# Patient Record
Sex: FEMALE | Race: WHITE | URBAN - METROPOLITAN AREA
[De-identification: names, ages, dates, MRNs, and addresses within clinical notes are randomized per-mention and may not be internally consistent; named-entity substitution may affect disease eponyms.]

---

## 2021-11-08 ENCOUNTER — APPOINTMENT (RX ONLY)
Dept: URBAN - METROPOLITAN AREA CLINIC 12 | Facility: CLINIC | Age: 61
Setting detail: DERMATOLOGY
End: 2021-11-08

## 2021-11-08 DIAGNOSIS — L259 CONTACT DERMATITIS AND OTHER ECZEMA, UNSPECIFIED CAUSE: ICD-10-CM | Status: WORSENING

## 2021-11-08 PROBLEM — L30.8 OTHER SPECIFIED DERMATITIS: Status: ACTIVE | Noted: 2021-11-08

## 2021-11-08 PROCEDURE — 99203 OFFICE O/P NEW LOW 30 MIN: CPT

## 2021-11-08 PROCEDURE — ? COUNSELING

## 2021-11-08 PROCEDURE — ? TREATMENT REGIMEN

## 2021-11-08 PROCEDURE — ? OTHER

## 2021-11-08 PROCEDURE — ? PRESCRIPTION

## 2021-11-08 RX ORDER — TRIAMCINOLONE ACETONIDE 1 MG/G
OINTMENT TOPICAL
Qty: 453.6 | Refills: 1 | Status: ERX | COMMUNITY
Start: 2021-11-08

## 2021-11-08 RX ORDER — CEPHALEXIN 500 MG/1
CAPSULE ORAL
Qty: 20 | Refills: 0 | Status: ERX | COMMUNITY
Start: 2021-11-08

## 2021-11-08 RX ADMIN — CEPHALEXIN: 500 CAPSULE ORAL at 00:00

## 2021-11-08 RX ADMIN — TRIAMCINOLONE ACETONIDE: 1 OINTMENT TOPICAL at 00:00

## 2021-11-08 NOTE — PROCEDURE: COUNSELING
Detail Level: Detailed
Cleanser Recommendations: Aveeno Body Wash, Fragrance-Free
Antihistamine Recommendations: Zyrtec 10 mg once or twice daily (drowsiness warned)\\nBenadryl 25-50 before bed PRN
Moisturizer Recommendations: CeraVe Cream or Aveeno cream

## 2021-11-08 NOTE — PROCEDURE: OTHER
Detail Level: Zone
Render Risk Assessment In Note?: no
Note Text (......Xxx Chief Complaint.): This diagnosis correlates with the
Other (Free Text): present x 1.5 weeks\\nvery itchy arms, chest and upper gluteal fold.\\npt has changed to laundry detergent pods in the past 1+ month.\\nSoaps: deodorant soap for skin folds, switches body wash with fragrances\\nMoisturizer: none\\nquick hot showers.  \\nPmhx of asthma and allergies (mold)\\ndenies hx of eczema.\\nRubbing EToH\\n\\nworks with pt’s that cannot care for themselves.  \\ndenies hx of neck and shoulder problems

## 2021-11-08 NOTE — HPI: RASH
What Type Of Note Output Would You Prefer (Optional)?: Bullet Format
How Severe Is Your Rash?: moderate
Is This A New Presentation, Or A Follow-Up?: Rash
Additional History: Declines fbe, here today for rash evaluation on arms, chest and back

## 2021-11-08 NOTE — PROCEDURE: TREATMENT REGIMEN
Modify Regimen: Fragrance and Dye Free body wash, quick luke warm showers.
Initiate Treatment: TAC oinment bid x 3 weeks then m-f, skip weekends. repeat as needed. NOT FOR FACE\\ncephalexin 500 mg one tablet twice daily for 10 days.
Detail Level: Zone
Otc Regimen: Allegra 180 mg: take 1 tablet twice daily\\nBenadryl 25-50 mg before bed for itch\\nCeraVe Itch Relief Lotion as often as needed for itch.

## 2021-11-17 ENCOUNTER — APPOINTMENT (RX ONLY)
Dept: URBAN - METROPOLITAN AREA CLINIC 12 | Facility: CLINIC | Age: 61
Setting detail: DERMATOLOGY
End: 2021-11-17

## 2021-11-17 DIAGNOSIS — L259 CONTACT DERMATITIS AND OTHER ECZEMA, UNSPECIFIED CAUSE: ICD-10-CM | Status: WORSENING

## 2021-11-17 PROBLEM — L30.9 DERMATITIS, UNSPECIFIED: Status: ACTIVE | Noted: 2021-11-17

## 2021-11-17 PROCEDURE — ? PRESCRIPTION

## 2021-11-17 PROCEDURE — ? OTHER

## 2021-11-17 PROCEDURE — ? TREATMENT REGIMEN

## 2021-11-17 PROCEDURE — ? COUNSELING

## 2021-11-17 PROCEDURE — 11105 PUNCH BX SKIN EA SEP/ADDL: CPT

## 2021-11-17 PROCEDURE — ? BIOPSY BY PUNCH METHOD

## 2021-11-17 PROCEDURE — 11104 PUNCH BX SKIN SINGLE LESION: CPT

## 2021-11-17 RX ORDER — DOXEPIN HYDROCHLORIDE 10 MG/1
CAPSULE ORAL
Qty: 60 | Refills: 1 | Status: ERX | COMMUNITY
Start: 2021-11-17

## 2021-11-17 RX ORDER — CEPHALEXIN 500 MG/1
CAPSULE ORAL
Qty: 20 | Refills: 0 | Status: ERX

## 2021-11-17 RX ORDER — TRIAMCINOLONE ACETONIDE 1 MG/G
OINTMENT TOPICAL
Qty: 453.6 | Refills: 1 | Status: ACTIVE

## 2021-11-17 RX ADMIN — DOXEPIN HYDROCHLORIDE: 10 CAPSULE ORAL at 00:00

## 2021-11-17 ASSESSMENT — LOCATION SIMPLE DESCRIPTION DERM: LOCATION SIMPLE: LEFT FOREARM

## 2021-11-17 ASSESSMENT — LOCATION DETAILED DESCRIPTION DERM: LOCATION DETAILED: LEFT PROXIMAL DORSAL FOREARM

## 2021-11-17 ASSESSMENT — LOCATION ZONE DERM: LOCATION ZONE: ARM

## 2021-11-17 NOTE — PROCEDURE: BIOPSY BY PUNCH METHOD
Body Location Override (Optional - Billing Will Still Be Based On Selected Body Map Location If Applicable): left forearm
Detail Level: Detailed
Was A Bandage Applied: Yes
Punch Size In Mm: 4
Biopsy Type: H and E
Anesthesia Type: 1% lidocaine with epinephrine
Anesthesia Volume In Cc (Will Not Render If 0): 0.5
Additional Anesthesia Volume In Cc (Will Not Render If 0): 0
Hemostasis: None
Epidermal Sutures: 4-0 Nylon
Wound Care: Petrolatum
Dressing: bandage
Patient Will Remove Sutures At Home?: No
Lab: -101
Consent: Verbal consent was obtained and risks were reviewed including but not limited to scarring, infection, bleeding, scabbing, incomplete removal, nerve damage and allergy to anesthesia.
Post-Care Instructions: I reviewed with the patient in detail post-care instructions. Patient is to keep the biopsy site dry overnight, and then apply bacitracin twice daily until healed. Patient may apply hydrogen peroxide soaks to remove any crusting.
Home Suture Removal Text: Patient was provided a home suture removal kit and will remove their sutures at home.  If they have any questions or difficulties they will call the office.
Notification Instructions: Patient will be notified of biopsy results. However, patient instructed to call the office if not contacted within 2 weeks.
Billing Type: Third-Party Bill
Information: Selecting Yes will display possible errors in your note based on the variables you have selected. This validation is only offered as a suggestion for you. PLEASE NOTE THAT THE VALIDATION TEXT WILL BE REMOVED WHEN YOU FINALIZE YOUR NOTE. IF YOU WANT TO FAX A PRELIMINARY NOTE YOU WILL NEED TO TOGGLE THIS TO 'NO' IF YOU DO NOT WANT IT IN YOUR FAXED NOTE.
Body Location Override (Optional - Billing Will Still Be Based On Selected Body Map Location If Applicable): left forearm DIF
Biopsy Type: DIF (perilesional)

## 2021-11-17 NOTE — PROCEDURE: TREATMENT REGIMEN
Continue Regimen: TAC oinment bid x 3 weeks then m-f, skip weekends. repeat as needed. NOT FOR FACE\\ncephalexin 500 mg one tablet twice daily for 10 days.
Modify Regimen: Fragrance and Dye Free body wash, quick luke warm showers.
Initiate Treatment: Allegra 180 mg twice daily\\ndoxepin 10 - 20 mg before bed.
Detail Level: Zone
Discontinue Regimen: benadryl

## 2021-11-17 NOTE — PROCEDURE: MIPS QUALITY
Quality 431: Preventive Care And Screening: Unhealthy Alcohol Use - Screening: Patient screened for unhealthy alcohol use using a single question and scores less than 2 times per year
Detail Level: Detailed
Quality 110: Preventive Care And Screening: Influenza Immunization: Influenza Immunization previously received during influenza season
Quality 130: Documentation Of Current Medications In The Medical Record: Current Medications Documented
Quality 226: Preventive Care And Screening: Tobacco Use: Screening And Cessation Intervention: Patient screened for tobacco use, is a smoker AND received Cessation Counseling
Quality 431: Preventive Care And Screening: Unhealthy Alcohol Use - Screening: Patient not identified as an unhealthy alcohol user when screened for unhealthy alcohol use using a systematic screening method

## 2021-11-17 NOTE — PROCEDURE: OTHER
1. Keep up the good work with diet and exercise  2. Increase topamax to 100 mg daily  3. Monitor blood pressure periodically and notify DR. Talley if consistently > 140/90  4. Follow-up 3-6 months for next refill  
Detail Level: Zone
Render Risk Assessment In Note?: no
Note Text (......Xxx Chief Complaint.): This diagnosis correlates with the
Other (Free Text): 11/17/21 worsening rash on arms.  chest and upper gluteal fold improved.  No other rashes present.  worse at night.  itching constantly.  \\n\\n\\npresent x 1.5 weeks\\nvery itchy arms, chest and upper gluteal fold.\\npt has changed to laundry detergent pods in the past 1+ month.\\nSoaps: deodorant soap for skin folds, switches body wash with fragrances\\nMoisturizer: none\\nquick hot showers.  \\nPmhx of asthma and allergies (mold)\\ndenies hx of eczema.\\nRubbing EToH\\n\\nworks with pt’s that cannot care for themselves.  \\ndenies hx of neck and shoulder problems

## 2022-01-05 ENCOUNTER — APPOINTMENT (RX ONLY)
Dept: URBAN - METROPOLITAN AREA CLINIC 12 | Facility: CLINIC | Age: 62
Setting detail: DERMATOLOGY
End: 2022-01-05

## 2022-01-05 DIAGNOSIS — L20.89 OTHER ATOPIC DERMATITIS: ICD-10-CM | Status: INADEQUATELY CONTROLLED

## 2022-01-05 PROBLEM — L20.84 INTRINSIC (ALLERGIC) ECZEMA: Status: ACTIVE | Noted: 2022-01-05

## 2022-01-05 PROCEDURE — ? PRESCRIPTION

## 2022-01-05 PROCEDURE — ? TREATMENT REGIMEN

## 2022-01-05 PROCEDURE — ? OTHER

## 2022-01-05 PROCEDURE — ? LAB REPORTS REVIEWED

## 2022-01-05 PROCEDURE — 99214 OFFICE O/P EST MOD 30 MIN: CPT

## 2022-01-05 PROCEDURE — ? COUNSELING

## 2022-01-05 RX ORDER — DOXEPIN HYDROCHLORIDE 10 MG/1
CAPSULE ORAL
Qty: 60 | Refills: 1 | Status: ACTIVE

## 2022-01-05 RX ORDER — TACROLIMUS 1 MG/G
OINTMENT TOPICAL
Qty: 100 | Refills: 2 | Status: ERX | COMMUNITY
Start: 2022-01-05

## 2022-01-05 RX ADMIN — TACROLIMUS: 1 OINTMENT TOPICAL at 00:00

## 2022-01-05 ASSESSMENT — LOCATION DETAILED DESCRIPTION DERM
LOCATION DETAILED: UPPER STERNUM
LOCATION DETAILED: RIGHT PROXIMAL POSTERIOR UPPER ARM
LOCATION DETAILED: LEFT DISTAL POSTERIOR UPPER ARM
LOCATION DETAILED: RIGHT LATERAL DISTAL PRETIBIAL REGION
LOCATION DETAILED: LEFT PROXIMAL RADIAL DORSAL FOREARM
LOCATION DETAILED: RIGHT PROXIMAL DORSAL FOREARM
LOCATION DETAILED: LEFT ANKLE

## 2022-01-05 ASSESSMENT — LOCATION SIMPLE DESCRIPTION DERM
LOCATION SIMPLE: RIGHT POSTERIOR UPPER ARM
LOCATION SIMPLE: LEFT FOREARM
LOCATION SIMPLE: CHEST
LOCATION SIMPLE: LEFT ANKLE
LOCATION SIMPLE: RIGHT PRETIBIAL REGION
LOCATION SIMPLE: RIGHT FOREARM
LOCATION SIMPLE: LEFT POSTERIOR UPPER ARM

## 2022-01-05 ASSESSMENT — LOCATION ZONE DERM
LOCATION ZONE: LEG
LOCATION ZONE: TRUNK
LOCATION ZONE: ARM

## 2022-01-05 NOTE — PROCEDURE: LAB REPORTS REVIEWED
Summarized Lab Results: 11/2021 biospy\\nA. left forearm: Slight spongiotic dermatitis with eosinophils and scattered necrotic keratinocytes\\nNOTE: The histologic differential diagnosis includes a subtle traumatized eczematous\\ndermatitis (allergic contact dermatitis, nummular dermatitis, atopic dermatitis) vs an\\nirritant contact dermatitis vs a photo-related dermatitis vs a drug eruption. Clinical\\ncorrelation is recommended. PAS stain is negative for fungal hyphae. Multiple\\nadditional levels have been examined.\\nB. left forearm - DIF: Negative direct immunofluorescence studies\\nNOTE: There is no significant deposition for IgA, IgG, IgM, C3, and fibrinogen.\\nCorrelation with clinical and light microscopy findings are recommended.\\n--- called pt review results. she did not give us med list and cannot discuss now, will call back with\\ncomplete list (Rx, OTC, herbal, illicit) so I can review.\\n--- she states her rash is much better with current regimen just has new patch on her ankle.
Detail Level: Detailed

## 2022-01-05 NOTE — PROCEDURE: OTHER
Detail Level: Zone
Render Risk Assessment In Note?: no
Note Text (......Xxx Chief Complaint.): This diagnosis correlates with the
Other (Free Text): 11/17/21 worsening rash on arms.  chest and upper gluteal fold improved.  No other rashes present.  worse at night.  itching constantly.  \\n\\n\\npresent x 1.5 weeks\\nvery itchy arms, chest and upper gluteal fold.\\npt has changed to laundry detergent pods in the past 1+ month.\\nSoaps: deodorant soap for skin folds, switches body wash with fragrances\\nMoisturizer: none\\nquick hot showers.  \\nPmhx of asthma and allergies (mold)\\ndenies hx of eczema.\\nRubbing EToH\\n\\nworks with pt’s that cannot care for themselves.  \\ndenies hx of neck and shoulder problems

## 2022-01-05 NOTE — PROCEDURE: TREATMENT REGIMEN
Continue Regimen: Allegra 180 mg twice daily\\ndoxepin 10 - 20 mg before bed.
Modify Regimen: Fragrance and Dye Free body wash, quick luke warm showers.\\nMust moisturize EVERY DAY - samples of CeraVe Cream provided.
Initiate Treatment: protopic ointment bid
Plan: pt still refuses to give us a med list.  she takes a few prescriptions a lot of herbals. she states she did her own research and that itching is not a side effect of any of her meds. \\n—- strongly advise her to give us an accurate med list : Rx, OTC, HERBAL AND ILLICIT.
Detail Level: Zone
Discontinue Regimen: TAC

## 2022-01-05 NOTE — PROCEDURE: COUNSELING
Detail Level: Detailed
Cleanser Recommendations: Aveeno Body Wash, Fragrance-Free
Antihistamine Recommendations: Zyrtec 10 mg once or twice daily (drowsiness warned)\\nBenadryl 25-50 before bed PRN
Moisturizer Recommendations: CeraVe Cream or Aveeno cream
CBC CMP HgbA1C  UA on  chart dated 11/13/19. T&S done at PST